# Patient Record
Sex: MALE | Race: WHITE | NOT HISPANIC OR LATINO | Employment: STUDENT | URBAN - NONMETROPOLITAN AREA
[De-identification: names, ages, dates, MRNs, and addresses within clinical notes are randomized per-mention and may not be internally consistent; named-entity substitution may affect disease eponyms.]

---

## 2017-04-11 ENCOUNTER — COMMUNICATION - GICH (OUTPATIENT)
Dept: FAMILY MEDICINE | Facility: OTHER | Age: 22
End: 2017-04-11

## 2017-04-11 DIAGNOSIS — F34.1 DYSTHYMIC DISORDER: ICD-10-CM

## 2018-01-04 NOTE — TELEPHONE ENCOUNTER
Patient Information     Patient Name MRN Nate Barba [id number] Male 1995      Telephone Encounter by Ronald Christian RN at 2017  4:35 PM     Author:  Ronald Christian RN Service:  (none) Author Type:  NURS- Registered Nurse     Filed:  2017  4:43 PM Encounter Date:  2017 Status:  Signed     :  Ronald Christian RN (NURS- Registered Nurse)            This is a Refill request from: [facility]  Name of Medication: Prozac  Quantity requested: 270 tabs  Last fill date: Unknown, but filled for 1 year supply in chart on 3/18/15  Due for refill: Yes  Last visit with SUMI BONE was on: 2015 in [facility]  PCP:  Sumi Bone MD  Controlled Substance Agreement:  N/A   Diagnosis r/t this medication request: Anxiety/depression    PHQ Depression Screening 2015   Date of PHQ exam (doc flow) 2015   1. Lack of interest/pleasure 2 - More than half the days   2. Feeling down/depressed 1 - Several days   PHQ-2 TOTAL SCORE 3   3. Trouble sleeping 0 - Not at all   4. Decreased energy 3 - Nearly every day   5. Appetite change 1 - Several days   6. Feelings of failure 1 - Several days   7. Trouble concentrating 1 - Several days   8. Activity level 2 - More than half the days   9. Hurting yourself 1 - Several days   PHQ-9 TOTAL SCORE 12   PHQ-9 Severity Level moderate   Functional Impairment somewhat difficult     Chart review shows that patient was last seen by PCP on 8/18/15 for continued use of prozac. Patient is overdue for an office visit with PCP. Writer called patient today to discuss. Unable to reach patient at this time. Detailed message left on patient's voicemail about need to be seen. Will route rx request to PCP for his consideration/approval.     Unable to complete prescription refill per RN Medication Refill Policy.................... Ronald Christian RN ....................  2017   4:37 PM

## 2018-01-05 ENCOUNTER — HISTORY (OUTPATIENT)
Dept: FAMILY MEDICINE | Facility: OTHER | Age: 23
End: 2018-01-05

## 2018-01-05 ENCOUNTER — OFFICE VISIT - GICH (OUTPATIENT)
Dept: FAMILY MEDICINE | Facility: OTHER | Age: 23
End: 2018-01-05

## 2018-01-05 DIAGNOSIS — J01.90 ACUTE SINUSITIS: ICD-10-CM

## 2018-01-05 ASSESSMENT — ANXIETY QUESTIONNAIRES
GAD7 TOTAL SCORE: 4
1. FEELING NERVOUS, ANXIOUS, OR ON EDGE: NOT AT ALL
7. FEELING AFRAID AS IF SOMETHING AWFUL MIGHT HAPPEN: NOT AT ALL
3. WORRYING TOO MUCH ABOUT DIFFERENT THINGS: SEVERAL DAYS
5. BEING SO RESTLESS THAT IT IS HARD TO SIT STILL: MORE THAN HALF THE DAYS
2. NOT BEING ABLE TO STOP OR CONTROL WORRYING: NOT AT ALL
4. TROUBLE RELAXING: SEVERAL DAYS
6. BECOMING EASILY ANNOYED OR IRRITABLE: NOT AT ALL

## 2018-01-05 ASSESSMENT — PATIENT HEALTH QUESTIONNAIRE - PHQ9: SUM OF ALL RESPONSES TO PHQ QUESTIONS 1-9: 7

## 2018-01-31 ENCOUNTER — DOCUMENTATION ONLY (OUTPATIENT)
Dept: FAMILY MEDICINE | Facility: OTHER | Age: 23
End: 2018-01-31

## 2018-01-31 RX ORDER — FLUTICASONE PROPIONATE 50 MCG
SPRAY, SUSPENSION (ML) NASAL
COMMUNITY
Start: 2015-08-06 | End: 2019-08-23

## 2018-01-31 RX ORDER — LORAZEPAM 0.5 MG/1
TABLET ORAL
COMMUNITY
Start: 2016-06-20 | End: 2019-08-23

## 2018-02-09 VITALS
HEIGHT: 76 IN | DIASTOLIC BLOOD PRESSURE: 68 MMHG | BODY MASS INDEX: 28.62 KG/M2 | TEMPERATURE: 96.4 F | SYSTOLIC BLOOD PRESSURE: 122 MMHG | HEART RATE: 60 BPM | WEIGHT: 235 LBS

## 2018-02-10 ASSESSMENT — ANXIETY QUESTIONNAIRES: GAD7 TOTAL SCORE: 4

## 2018-02-10 ASSESSMENT — PATIENT HEALTH QUESTIONNAIRE - PHQ9: SUM OF ALL RESPONSES TO PHQ QUESTIONS 1-9: 7

## 2018-02-12 NOTE — PROGRESS NOTES
Patient Information     Patient Name MRN Sex Nate Srinivasan [id number] Male 1995      Progress Notes by Shante Newton PA-C at 2018  9:15 AM     Author:  Shante Newton PA-C Service:  (none) Author Type:  PHYS- Physician Assistant     Filed:  2018 10:16 AM Encounter Date:  2018 Status:  Signed     :  Shante Newton PA-C (PHYS- Physician Assistant)            Nursing Notes:   Alicia Mast  2018  9:35 AM  Signed  Patient presents to the clinic for sinus congestion and feeling like everything smells horrible.  Alicia Mast LPN........................2018  9:27 AM      HPI:    Nate Nelson is a 22 y.o. male who presents for sinus congestion and plugged nose x few weeks. LNs hurt in neck.  Left ear pain. Everything smells bad. ST a week ago for a few days. No cough, wheezing, rattling.  No GI symptoms, fevers, chills.      Past Medical History:     Diagnosis  Date     Allergic rhinitis 2004     Articulation disorder     mild difficulty with R's and S's      Collapsed arches     prolapsed arches, congital - right foot surgery w Dr. Alaniz in [place] 3/06      Depression with anxiety      Emotional lability     possible dysthymia      Hepatitis      Major depressive episode     Neurologist-Dr. Encarnacion      Tourette's syndrome        Past Surgical History:      Procedure  Laterality Date     ANESTHESIA ALERT      Nate has had general anesthesia during tonsillectomy in  without difficulties.         LEG/ANKLE SURGERY  3/06    congital - right, Dr. Alaniz in [place] ,Right accessory navicular, surgical excision        TONSILLECTOMY  07/10/03    Dr. Dhillon-general anesthesia without difficulties       TYMPANOSTOMY      with tubes         Social History     Substance Use Topics       Smoking status: Never Smoker     Smokeless tobacco: Never Used     Alcohol use No       Current Outpatient Prescriptions       Medication  Sig Dispense Refill      "FLUoxetine (PROZAC) 20 mg capsule Take 3 capsules by mouth every morning. 270 capsule 3     fluticasone (50 mcg per actuation) nasal solution (FLONASE) INHALE 1 SPRAY INTO BOTH NOSTRILS ONCE DAILY. 1 Bottle 2     LORazepam (ATIVAN) 0.5 mg tab TAKE 1 TABLET BY MOUTH EVERY 6 HOURS AS NEEDED FOR ANXIETY. 30 tablet 3     No current facility-administered medications for this visit.      Medications have been reviewed by me and are current to the best of my knowledge and ability.      Allergies      Allergen   Reactions     Latex  *Unknown     Verbalizes rash      Tape [Adhesive]  *Unknown       REVIEW OF SYSTEMS:  Refer to HPI.    EXAM:   Vitals:    /68 (Cuff Site: Right Arm, Position: Sitting, Cuff Size: Adult Regular)  Pulse 60  Temp 96.4  F (35.8  C) (Tympanic)  Ht 1.918 m (6' 3.5\")  Wt 106.6 kg (235 lb)  BMI 28.99 kg/m2    General Appearance: Pleasant, alert, appropriate appearance for age. No acute distress  Ear Exam: Normal TM's bilaterally, grey, translucent, bony landmarks appreciated.   Left/Right TM: Effusion is not present. TM is not bulging. There is no pus appreciated.    Normal auditory canals and external ears. Non-tender.   OroPharynx Exam:  Erythematous posterior pharynx with no exudates. No sinus pain upon palpation of frontal, ethmoid, and maxillary sinuses.  Chest/Respiratory Exam: Normal chest wall and respirations. Clear to auscultation. No retractions appreciated.  Cardiovascular Exam: Regular rate and rhythm. S1, S2, no murmur, click, gallop, or rubs.  Lymphatic Exam: ACLN.  Skin: no rash or abnormalities  Psychiatric Exam: Alert and oriented - appropriate affect.    PHQ Depression Screen  Date of PHQ exam: 01/05/18  Over the last 2 weeks, how often have you been bothered by any of the following problems?  1. Little interest or pleasure in doing things: 1 - Several days  2. Feeling down, depressed, or hopeless: 2 - More than half the days  3. Trouble falling or staying asleep, or " sleeping too much: 0 - Not at all  4. Feeling tired or having little energy: 1 - Several days  5. Poor appetite or overeatin - Several days  6. Feeling bad about yourself - or that you are a failure or have let yourself or your family down: 0 - Not at all  7. Trouble concentrating on things, such as reading the newspaper or watching television: 2 - More than half the days  8. Moving or speaking so slowly that other people could have noticed. Or the opposite - being so fidgety or restless that you have been moving around a lot more than usual: 0 - Not at all  9. Thoughts that you would be better off dead, or of hurting yourself in some way: 0 - Not at all    PHQ-9 TOTAL SCORE: 7  Depression Severity Level: mild  If any answers were positive, how difficult have these problems made it for you to do your work, take care of things at home, or get along with other people: not difficult at all    LABS:    No results found for this visit on 18.    ASSESSMENT AND PLAN:      ICD-10-CM    1. Acute non-recurrent sinusitis, unspecified location J01.90 amoxicillin-clavulanate 875-125 mg tablet (AUGMENTIN)       Started on augmentin for an acute sinus infection.     Patient Instructions   Sinus infection - Antibiotic has been sent to pharmacy. Please take full course of antibiotic even if symptoms have completely resolved. This helps prevent against antibiotic resistance.     May use symptomatic care with tylenol or ibuprofen. May use cough syrup or cough drops.  Using a humidifier works well to break up the congestion. You can also sleep propped up on a couple pillows to decrease symptoms at night.     Use a Neti Pot/sinus flush (Charlie Med Sinus Rinse) 3 times daily to irrigate sinuses/mucosal tissue.     Sudafed or mucinex work well for congestion.   If you choose pseudoephedrine, use for only 5-7 days AS DIRECTED. Speak to your pharmacist if you have any concerns about your medications. May also use decongestant nasal  spray, but only for 3 days MAXIMUM.    Please take tylenol as needed up to 4 times daily.  Treat symptomatically with warm salt water gargles.  Lozenges, Tylenol, Advil or Aleve as needed. Frequent swallows of cool liquid.  Oatmeal coats the throat and some patients find it soothes the pain.     Monitor for any fevers or chills. Return in 7-10 days if not feeling better. Please call clinic with any questions or concerns. Please take in a lot of fluids and get rest.     You will need to be evaluated if you start to experience:  Fever higher than 102.5 F (39.2 C)   Sudden and severe pain in the face and head   Trouble seeing or seeing double   Trouble thinking clearly   Swelling or redness around 1 or both eyes   Trouble breathing or a stiff neck    * If you are a smoker, try to quit *    Call 9-1-1 or go to the emergency room if you:  Have trouble breathing   Are drooling because you cannot swallow your saliva   Have swelling of the neck or tongue   Cannot move your neck or have trouble opening your mouth        Shante Newton PA-C..................1/5/2018 9:35 AM

## 2018-02-12 NOTE — PATIENT INSTRUCTIONS
Patient Information     Patient Name MRN Nate Barba [id number] Male 1995      Patient Instructions by Shante Newton PA-C at 2018  9:15 AM     Author:  Shante Newton PA-C Service:  (none) Author Type:  PHYS- Physician Assistant     Filed:  2018  9:38 AM Encounter Date:  2018 Status:  Signed     :  Shante Newton PA-C (ESE- Physician Assistant)            Sinus infection - Antibiotic has been sent to pharmacy. Please take full course of antibiotic even if symptoms have completely resolved. This helps prevent against antibiotic resistance.     May use symptomatic care with tylenol or ibuprofen. May use cough syrup or cough drops.  Using a humidifier works well to break up the congestion. You can also sleep propped up on a couple pillows to decrease symptoms at night.     Use a Neti Pot/sinus flush (Charlie Med Sinus Rinse) 3 times daily to irrigate sinuses/mucosal tissue.     Sudafed or mucinex work well for congestion.   If you choose pseudoephedrine, use for only 5-7 days AS DIRECTED. Speak to your pharmacist if you have any concerns about your medications. May also use decongestant nasal spray, but only for 3 days MAXIMUM.    Please take tylenol as needed up to 4 times daily.  Treat symptomatically with warm salt water gargles.  Lozenges, Tylenol, Advil or Aleve as needed. Frequent swallows of cool liquid.  Oatmeal coats the throat and some patients find it soothes the pain.     Monitor for any fevers or chills. Return in 7-10 days if not feeling better. Please call clinic with any questions or concerns. Please take in a lot of fluids and get rest.     You will need to be evaluated if you start to experience:  Fever higher than 102.5 F (39.2 C)   Sudden and severe pain in the face and head   Trouble seeing or seeing double   Trouble thinking clearly   Swelling or redness around 1 or both eyes   Trouble breathing or a stiff neck    * If you are a smoker, try to quit  *    Call 9-1-1 or go to the emergency room if you:  Have trouble breathing   Are drooling because you cannot swallow your saliva   Have swelling of the neck or tongue   Cannot move your neck or have trouble opening your mouth

## 2018-02-12 NOTE — NURSING NOTE
Patient Information     Patient Name MRNate Rasmussen [id number] Male 1995      Nursing Note by Alicia Mast at 2018  9:15 AM     Author:  Alicia Mast Service:  (none) Author Type:  (none)     Filed:  2018  9:35 AM Encounter Date:  2018 Status:  Signed     :  Alicia Mast            Patient presents to the clinic for sinus congestion and feeling like everything smells horrible.  Alicia Mast LPN........................2018  9:27 AM

## 2018-04-26 DIAGNOSIS — F34.1 DYSTHYMIC DISORDER: Primary | ICD-10-CM

## 2018-04-26 NOTE — TELEPHONE ENCOUNTER
As per RN refill protocol, patient has to have a PHQ9 result of less than 5 in last 6 months for writer to be able to fill rx as requested. Last OV and PHQ9 were completed on 1/5/18 with RAHEEM Butler. Score was 7 as per office visit notes on that date. Writer will carroll up and route rx request to PCP for his consideration/approval at this time. No changes were noted to rx as requested in office visit notes on 1/5/18.    Unable to complete prescription refill per RN Medication Refill Policy. Ronald Christian 4/26/2018 9:14 AM

## 2018-07-12 ENCOUNTER — TELEPHONE (OUTPATIENT)
Dept: FAMILY MEDICINE | Facility: OTHER | Age: 23
End: 2018-07-12

## 2018-07-12 NOTE — TELEPHONE ENCOUNTER
Scheduled an appt for pt on Monday with DWS @ 9:15 for an order for a blood TB test that he needs done for school. I was not sure if pt needed to be seen or if the order can just be placed. Please let pt know if you can place order with out office visit. Call pt on primary cell ph#. Thanks.    Shanika Mello on 7/12/2018 at 5:03 PM

## 2018-07-13 NOTE — TELEPHONE ENCOUNTER
Patient is keeping appointment with Dr. Buchanan.    Prerna Sheppard LPN on 7/13/2018 at 10:29 AM

## 2018-07-13 NOTE — TELEPHONE ENCOUNTER
Left message for patient to call back. If he only needs the TB test, he can be put in the injection nurses schedule.    Prerna Sheppard LPN on 7/13/2018 at 8:38 AM

## 2018-07-16 ENCOUNTER — OFFICE VISIT (OUTPATIENT)
Dept: FAMILY MEDICINE | Facility: OTHER | Age: 23
End: 2018-07-16
Attending: FAMILY MEDICINE
Payer: COMMERCIAL

## 2018-07-16 VITALS
WEIGHT: 242 LBS | SYSTOLIC BLOOD PRESSURE: 126 MMHG | HEIGHT: 75 IN | DIASTOLIC BLOOD PRESSURE: 80 MMHG | BODY MASS INDEX: 30.09 KG/M2 | HEART RATE: 60 BPM

## 2018-07-16 DIAGNOSIS — Z23 ENCOUNTER FOR IMMUNIZATION: ICD-10-CM

## 2018-07-16 DIAGNOSIS — Z11.1 SCREENING EXAMINATION FOR PULMONARY TUBERCULOSIS: Primary | ICD-10-CM

## 2018-07-16 PROCEDURE — 36415 COLL VENOUS BLD VENIPUNCTURE: CPT | Performed by: FAMILY MEDICINE

## 2018-07-16 PROCEDURE — 86480 TB TEST CELL IMMUN MEASURE: CPT | Performed by: FAMILY MEDICINE

## 2018-07-16 PROCEDURE — 99213 OFFICE O/P EST LOW 20 MIN: CPT | Performed by: FAMILY MEDICINE

## 2018-07-16 NOTE — NURSING NOTE
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    Prerna Sheppard LPN on 7/16/2018 at 9:32 AM

## 2018-07-16 NOTE — LETTER
July 19, 2018      Nate Nelson  [address] [facility] [address]        Dear ,    We are writing to inform you of your test results.    Testing for tuberculosis came back negative.    Resulted Orders   M Tuberculosis by Quantiferon   Result Value Ref Range    M Tuberculosis Result Negative NEG^Negative    M Tuberculosis Antigen Value 0.00 IU/mL      Comment:      This is a qualitative test.  The TB antigen IU/mL value is required for   documentation on certain government reporting forms but this value should not   be used to monitor disease progression or response to therapy.  Diagnosing or excluding tuberculosis disease, and assessing the probability of   LTBI, require a combination of epidemiological, historical, medical and   diagnostic findings that should be taken into account when interpreting   QuantiFERON TB results.         If you have any questions or concerns, please call the clinic at the number listed above.       Sincerely,        Celso Buchanan MD

## 2018-07-16 NOTE — PROGRESS NOTES
"SUBJECTIVE:  Nate Nelson is a 23 year old male here for paperwork.  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  He needs his immunization form completed as well as testing for tuberculosis performed.  He has no specific questions today.    ROS:    As above otherwise ROS is unremarkable.    OBJECTIVE:  /80  Pulse 60  Ht 6' 3\" (1.905 m)  Wt 242 lb (109.8 kg)  BMI 30.25 kg/m2    EXAM:  General Appearance: Pleasant, alert, appropriate appearance for age. No acute distress    ASSESSEMENT AND PLAN:    1. Screening examination for pulmonary tuberculosis    2. Encounter for immunization      His paperwork for immunizations was completed.  Will get QuantiFERON testing for tuberculosis.  I will contact him with results when they return.  He will follow-up as needed.    Saeed Buchanan MD    This document was prepared using voice generated software.  While every attempt was made for accuracy, grammatical errors may exist.  "

## 2018-07-16 NOTE — MR AVS SNAPSHOT
"              After Visit Summary   7/16/2018    Nate Nelson    MRN: [id number]           Patient Information     Date Of Birth          1995        Visit Information        Provider Department      7/16/2018 9:15 AM Celso Buchanan MD [facility]        Today's Diagnoses     Screening examination for pulmonary tuberculosis    -  1    Encounter for immunization           Follow-ups after your visit        Who to contact     If you have questions or need follow up information about today's clinic visit or your schedule please contact [facility] directly at [phone].  Normal or non-critical lab and imaging results will be communicated to you by MyChart, letter or phone within 4 business days after the clinic has received the results. If you do not hear from us within 7 days, please contact the clinic through MyChart or phone. If you have a critical or abnormal lab result, we will notify you by phone as soon as possible.  Submit refill requests through [employer] or call your pharmacy and they will forward the refill request to us. Please allow 3 business days for your refill to be completed.          Additional Information About Your Visit        Care EveryWhere ID     This is your Care EveryWhere ID. This could be used by other organizations to access your [facility] medical records  [id number]        Your Vitals Were     Pulse Height BMI (Body Mass Index)             60 6' 3\" (1.905 m) 30.25 kg/m2          Blood Pressure from Last 3 Encounters:   07/16/18 126/80   01/05/18 122/68   08/18/15 106/70    Weight from Last 3 Encounters:   07/16/18 242 lb (109.8 kg)   01/05/18 235 lb (106.6 kg)   08/18/15 224 lb (101.6 kg)              We Performed the Following     Quantiferon TB Gold Plus        Primary Care Provider Office Phone # Fax #    Celso Buchanan [phone] [address] [facility] [address]        Equal Access to Services  "    HIEN DAVID : Hadii aad ku stephen Garcia, waaxda luqadaha, qaybta kaalmada michaelalyssageena, waxtaylor aliza angelesgianguyen ness . So [facility] [phone].    ATENCIÓN: Si habla español, tiene a jensen disposición servicios gratuitos de asistencia lingüística. Llame al [phone].    We comply with applicable federal civil rights laws and [place] laws. We do not discriminate on the basis of race, color, national origin, age, disability, sex, sexual orientation, or gender identity.            Thank you!     Thank you for choosing [facility] AND [facility]  for your care. Our goal is always to provide you with excellent care. Hearing back from our patients is one way we can continue to improve our services. Please take a few minutes to complete the written survey that you may receive in the mail after your visit with us. Thank you!             Your Updated Medication List - Protect others around you: Learn how to safely use, store and throw away your medicines at www.disposemymeds.org.          This list is accurate as of 7/16/18 10:00 AM.  Always use your most recent med list.                   Brand Name Dispense Instructions for use Diagnosis    FLUoxetine 20 MG capsule    PROzac    270 capsule    Take 3 capsules (60 mg) by mouth every morning    Dysthymic disorder       fluticasone 50 MCG/ACT spray    FLONASE     INHALE 1 SPRAY INTO BOTH NOSTRILS ONCE DAILY.        LORazepam 0.5 MG tablet    ATIVAN

## 2018-07-18 LAB
M TB TUBERC IFN-G BLD QL: NEGATIVE
M TB TUBERC IFN-G/MITOGEN IGNF BLD: 0 IU/ML

## 2019-03-19 DIAGNOSIS — F34.1 DYSTHYMIC DISORDER: ICD-10-CM

## 2019-03-19 NOTE — TELEPHONE ENCOUNTER
"Refill request from [facility] for:  FLUoxetine (PROZAC) 20 MG capsule      LOV 7/16/2018 with PCP-screening for TB  No changes noted to requested medication     Pt is due for medication management appt.    No upcoming appt at this time    Will refill for 90 days, send reminder letter, and add note to Rx    Requested Prescriptions   Pending Prescriptions Disp Refills     FLUoxetine (PROZAC) 20 MG capsule 270 capsule 3     Sig: Take 3 capsules (60 mg) by mouth every morning    SSRIs Protocol Failed - 3/19/2019  4:50 PM       Failed - PHQ-9 score less than 5 in past 6 months    Please review last PHQ-9 score.          Failed - Recent (6 mo) or future (30 days) visit within the authorizing provider's specialty    Patient had office visit in the last 6 months or has a visit in the next 30 days with authorizing provider or within the authorizing provider's specialty.  See \"Patient Info\" tab in inbasket, or \"Choose Columns\" in Meds & Orders section of the refill encounter.           Passed - Medication is active on med list       Passed - Patient is age 18 or older      Rachel Briones RN  ....................  3/19/2019   4:55 PM        "

## 2019-03-19 NOTE — LETTER
March 19, 2019      Nate Nelson  [address] [address]        Dear Nate,     A refill of FLUoxetine (PROZAC) 20 MG capsule has been requested by your pharmacy.  We noticed that it has been greater than 12 months since your last comprehensive visit and labs with Celso Buchanan MD.  A limited 90 day supply has been sent to your pharmacy at this time.    Additional refills require a medication management appointment.  Your health is very important to us.  Please call the clinic at [phone] to schedule your appointment.    Thank you,    The Refill Nurse  [facility]

## 2019-07-11 DIAGNOSIS — F34.1 DYSTHYMIC DISORDER: Primary | ICD-10-CM

## 2019-07-16 NOTE — TELEPHONE ENCOUNTER
[id number] in [facility] in [place] sent Rx request for the following:      FLUOXETINE HCL 20 MG CAPSULE  Sig: TAKE 3 CAPSULES BY MOUTH EVERY MORNING  Last Prescription Date:   3/19/19  Last Fill Qty/Refills:         270, R-0    Notes to Pharmacy: **Limited fill** pt due for med management appt. Please advise pt to contact GI for assistance in scheduling appt for further refills.  Thanks!  Last Office Visit:              7/16/18  Future Office visit:           None  Routing refill request to provider for review/approval because:  SSRIs Protocol Failed7/16 9:05 AM   PHQ-9 score less than 5 in past 6 months    Recent (6 mo) or future (30 days) visit within the authorizing provider's specialty     Per refill protocol, Pt was due for 6-month follow-up around 1/16/19. Pt overdue. Per refill encounter, dated 3/19/19, Pt was sent reminder letter and given ezio refill. Pt failed to schedule appointment.    Unable to complete prescription refill per RN Medication Refill Policy. Susi Gonzalez RN .............. 7/16/2019  9:14 AM

## 2019-08-23 ENCOUNTER — OFFICE VISIT (OUTPATIENT)
Dept: FAMILY MEDICINE | Facility: OTHER | Age: 24
End: 2019-08-23
Attending: FAMILY MEDICINE
Payer: COMMERCIAL

## 2019-08-23 VITALS
WEIGHT: 248.2 LBS | TEMPERATURE: 97.6 F | BODY MASS INDEX: 30.86 KG/M2 | HEART RATE: 64 BPM | DIASTOLIC BLOOD PRESSURE: 62 MMHG | SYSTOLIC BLOOD PRESSURE: 118 MMHG | HEIGHT: 75 IN | RESPIRATION RATE: 12 BRPM

## 2019-08-23 DIAGNOSIS — F34.1 DYSTHYMIC DISORDER: Primary | ICD-10-CM

## 2019-08-23 DIAGNOSIS — J30.2 SEASONAL ALLERGIC RHINITIS, UNSPECIFIED TRIGGER: ICD-10-CM

## 2019-08-23 PROCEDURE — 99213 OFFICE O/P EST LOW 20 MIN: CPT | Performed by: FAMILY MEDICINE

## 2019-08-23 RX ORDER — FLUTICASONE PROPIONATE 50 MCG
SPRAY, SUSPENSION (ML) NASAL
Qty: 15.8 ML | Refills: 3 | Status: SHIPPED | OUTPATIENT
Start: 2019-08-23 | End: 2023-04-20

## 2019-08-23 ASSESSMENT — ANXIETY QUESTIONNAIRES
2. NOT BEING ABLE TO STOP OR CONTROL WORRYING: NOT AT ALL
3. WORRYING TOO MUCH ABOUT DIFFERENT THINGS: NOT AT ALL
6. BECOMING EASILY ANNOYED OR IRRITABLE: NOT AT ALL
5. BEING SO RESTLESS THAT IT IS HARD TO SIT STILL: NOT AT ALL
GAD7 TOTAL SCORE: 0
1. FEELING NERVOUS, ANXIOUS, OR ON EDGE: NOT AT ALL
7. FEELING AFRAID AS IF SOMETHING AWFUL MIGHT HAPPEN: NOT AT ALL

## 2019-08-23 ASSESSMENT — PATIENT HEALTH QUESTIONNAIRE - PHQ9
SUM OF ALL RESPONSES TO PHQ QUESTIONS 1-9: 0
5. POOR APPETITE OR OVEREATING: NOT AT ALL

## 2019-08-23 ASSESSMENT — PAIN SCALES - GENERAL: PAINLEVEL: NO PAIN (0)

## 2019-08-23 ASSESSMENT — MIFFLIN-ST. JEOR: SCORE: 2196.46

## 2019-08-24 ASSESSMENT — ANXIETY QUESTIONNAIRES: GAD7 TOTAL SCORE: 0

## 2019-08-25 NOTE — PROGRESS NOTES
SUBJECTIVE:   Nate Nelson is a 24 year old male who presents to clinic today for the following health issues: Follow-up depression    Patient arrives here for follow-up depression.  He is currently on Prozac 60 mg a day.  He was not able to get into see his primary care physician.  States that he is doing well.  Not complaining of any side effects.  Patient has a history of dysthymia in the chart.        Patient Active Problem List    Diagnosis Date Noted     Impetigo 11/04/2010     Priority: Medium     Overview:   around mouth and nares       Dysthymic disorder 04/05/2007     Priority: Medium     Overview:   with OCD tendincies       Congenital pes planus 05/10/2006     Priority: Medium     Allergic rhinitis 03/01/2004     Priority: Medium     Other developmental speech or language disorder 08/01/2003     Priority: Medium     Overview:   mild difficulty with R's and S's       Tourette's disorder 04/01/2002     Priority: Medium     Past Medical History:   Diagnosis Date     Acquired flat foot     prolapsed arches, congital - right foot surgery w Dr. Alaniz in [place] 3/06     Allergic rhinitis     2004     Emotional lability     possible dysthymia     Inflammatory liver disease     1999     Major depressive disorder, single episode     Neurologist-Dr. Encarnacion     Other specified anxiety disorders     No Comments Provided     Phonological disorder     2003,mild difficulty with R's and S's     Tourette's disorder     2002      Past Surgical History:   Procedure Laterality Date     ANKLE SURGERY      3/06,congital - right, Dr. Alaniz in [place] ,Right accessory navicular, surgical excision     OTHER SURGICAL HISTORY      [id number],ANESTHESIA ALERT,Nate has had general anesthesia during tonsillectomy in 2003 without difficulties.     TONSILLECTOMY      07/10/03,Dr. Dhillon-general anesthesia without difficulties     TYMPANOSTOMY, LOCAL/TOPICAL ANESTHESIA      1995,with tubes     Current Outpatient Medications  "  Medication Sig Dispense Refill     FLUoxetine (PROZAC) 20 MG capsule TAKE 3 CAPSULES BY MOUTH EVERY MORNING 270 capsule 3     fluticasone (FLONASE) 50 MCG/ACT nasal spray INHALE 1 SPRAY INTO BOTH NOSTRILS ONCE DAILY. 15.8 mL 3     Allergies   Allergen Reactions     Latex Unknown     Verbalizes rash     Liquid Adhesive Unknown       Review of Systems     OBJECTIVE:     /62   Pulse 64   Temp 97.6  F (36.4  C)   Resp 12   Ht 1.905 m (6' 3\")   Wt 112.6 kg (248 lb 3.2 oz)   BMI 31.02 kg/m    Body mass index is 31.02 kg/m .  Physical Exam   Constitutional: He appears well-developed and well-nourished.   Neurological: He is alert.   Psychiatric: He has a normal mood and affect. His behavior is normal.           ASSESSMENT/PLAN:         1. Dysthymic disorder  Refilled  - FLUoxetine (PROZAC) 20 MG capsule; TAKE 3 CAPSULES BY MOUTH EVERY MORNING  Dispense: 270 capsule; Refill: 3    2. Seasonal allergic rhinitis, unspecified trigger  Refill  - fluticasone (FLONASE) 50 MCG/ACT nasal spray; INHALE 1 SPRAY INTO BOTH NOSTRILS ONCE DAILY.  Dispense: 15.8 mL; Refill: 3    Follow-up 1 year unless needing follow-up sooner  Nate Figueroa MD  [facility] AND [facility]  "

## 2020-05-28 ENCOUNTER — OFFICE VISIT (OUTPATIENT)
Dept: FAMILY MEDICINE | Facility: OTHER | Age: 25
End: 2020-05-28
Attending: FAMILY MEDICINE
Payer: COMMERCIAL

## 2020-05-28 VITALS
DIASTOLIC BLOOD PRESSURE: 66 MMHG | OXYGEN SATURATION: 96 % | HEART RATE: 62 BPM | HEIGHT: 76 IN | TEMPERATURE: 98 F | WEIGHT: 273.2 LBS | BODY MASS INDEX: 33.27 KG/M2 | SYSTOLIC BLOOD PRESSURE: 124 MMHG | RESPIRATION RATE: 16 BRPM

## 2020-05-28 DIAGNOSIS — Z00.00 ROUTINE GENERAL MEDICAL EXAMINATION AT A HEALTH CARE FACILITY: Primary | ICD-10-CM

## 2020-05-28 DIAGNOSIS — Z13.1 SCREENING FOR DIABETES MELLITUS: ICD-10-CM

## 2020-05-28 DIAGNOSIS — F34.1 DYSTHYMIC DISORDER: ICD-10-CM

## 2020-05-28 DIAGNOSIS — Z13.220 LIPID SCREENING: ICD-10-CM

## 2020-05-28 LAB
ANION GAP SERPL CALCULATED.3IONS-SCNC: 7 MMOL/L (ref 3–14)
BUN SERPL-MCNC: 17 MG/DL (ref 7–25)
CALCIUM SERPL-MCNC: 9.3 MG/DL (ref 8.6–10.3)
CHLORIDE SERPL-SCNC: 105 MMOL/L (ref 98–107)
CHOLEST SERPL-MCNC: 135 MG/DL
CO2 SERPL-SCNC: 28 MMOL/L (ref 21–31)
CREAT SERPL-MCNC: 1.18 MG/DL (ref 0.7–1.3)
GFR SERPL CREATININE-BSD FRML MDRD: 75 ML/MIN/{1.73_M2}
GLUCOSE SERPL-MCNC: 94 MG/DL (ref 70–105)
HDLC SERPL-MCNC: 30 MG/DL (ref 23–92)
LDLC SERPL CALC-MCNC: 77 MG/DL
NONHDLC SERPL-MCNC: 105 MG/DL
POTASSIUM SERPL-SCNC: 4.2 MMOL/L (ref 3.5–5.1)
SODIUM SERPL-SCNC: 140 MMOL/L (ref 134–144)
TRIGL SERPL-MCNC: 139 MG/DL

## 2020-05-28 PROCEDURE — 99395 PREV VISIT EST AGE 18-39: CPT | Performed by: FAMILY MEDICINE

## 2020-05-28 PROCEDURE — 80061 LIPID PANEL: CPT | Mod: ZL | Performed by: FAMILY MEDICINE

## 2020-05-28 PROCEDURE — 36415 COLL VENOUS BLD VENIPUNCTURE: CPT | Mod: ZL | Performed by: FAMILY MEDICINE

## 2020-05-28 PROCEDURE — 80048 BASIC METABOLIC PNL TOTAL CA: CPT | Mod: ZL | Performed by: FAMILY MEDICINE

## 2020-05-28 ASSESSMENT — PAIN SCALES - GENERAL: PAINLEVEL: NO PAIN (0)

## 2020-05-28 ASSESSMENT — ANXIETY QUESTIONNAIRES
3. WORRYING TOO MUCH ABOUT DIFFERENT THINGS: NOT AT ALL
5. BEING SO RESTLESS THAT IT IS HARD TO SIT STILL: NOT AT ALL
GAD7 TOTAL SCORE: 0
IF YOU CHECKED OFF ANY PROBLEMS ON THIS QUESTIONNAIRE, HOW DIFFICULT HAVE THESE PROBLEMS MADE IT FOR YOU TO DO YOUR WORK, TAKE CARE OF THINGS AT HOME, OR GET ALONG WITH OTHER PEOPLE: NOT DIFFICULT AT ALL
1. FEELING NERVOUS, ANXIOUS, OR ON EDGE: NOT AT ALL
7. FEELING AFRAID AS IF SOMETHING AWFUL MIGHT HAPPEN: NOT AT ALL
2. NOT BEING ABLE TO STOP OR CONTROL WORRYING: NOT AT ALL
6. BECOMING EASILY ANNOYED OR IRRITABLE: NOT AT ALL

## 2020-05-28 ASSESSMENT — MIFFLIN-ST. JEOR: SCORE: 2317.79

## 2020-05-28 ASSESSMENT — PATIENT HEALTH QUESTIONNAIRE - PHQ9: 5. POOR APPETITE OR OVEREATING: NOT AT ALL

## 2020-05-28 NOTE — NURSING NOTE
"Coming in for a physical and would like labs    Chief Complaint   Patient presents with     Physical     and labs       Initial /66   Pulse 62   Temp 98  F (36.7  C)   Resp 16   Ht 1.918 m (6' 3.5\")   Wt 123.9 kg (273 lb 3.2 oz)   SpO2 96%   BMI 33.70 kg/m   Estimated body mass index is 33.7 kg/m  as calculated from the following:    Height as of this encounter: 1.918 m (6' 3.5\").    Weight as of this encounter: 123.9 kg (273 lb 3.2 oz).  Medication Reconciliation: complete    Christina Lynne, LPN  "

## 2020-05-28 NOTE — PROGRESS NOTES
3  SUBJECTIVE:   CC: Nate Nelson is an 24 year old male who presents for preventive health visit.     Healthy Habits:    Do you get at least three servings of calcium containing foods daily (dairy, green leafy vegetables, etc.)? yes    Amount of exercise or daily activities, outside of work: 7 day(s) per week    Problems taking medications regularly No    Medication side effects: No    Have you had an eye exam in the past two years? no    Do you see a dentist twice per year? 1-2    Do you have sleep apnea, excessive snoring or daytime drowsiness?no           Today's PHQ-2 Score:   PHQ-2 ( 1999 [employer]) 5/28/2020 7/16/2018   Q1: Little interest or pleasure in doing things 0 0   Q2: Feeling down, depressed or hopeless 0 0   PHQ-2 Score 0 0       Abuse: Current or Past(Physical, Sexual or Emotional)- No  Do you feel safe in your environment? Yes        Social History     Tobacco Use     Smoking status: Never Smoker     Smokeless tobacco: Never Used   Substance Use Topics     Alcohol use: Yes     Comment: 2 a week      If you drink alcohol do you typically have >3 drinks per day or >7 drinks per week? No                      Last PSA: No results found for: PSA    Reviewed orders with patient. Reviewed health maintenance and updated orders accordingly - Yes  Current Outpatient Medications   Medication Sig Dispense Refill     FLUoxetine (PROZAC) 20 MG capsule TAKE 3 CAPSULES BY MOUTH EVERY MORNING 270 capsule 3     fluticasone (FLONASE) 50 MCG/ACT nasal spray INHALE 1 SPRAY INTO BOTH NOSTRILS ONCE DAILY. 15.8 mL 3     Allergies   Allergen Reactions     Latex Unknown     Verbalizes rash     Liquid Adhesive Unknown       Reviewed and updated as needed this visit by clinical staff  Tobacco  Allergies  Meds  Med Hx  Soc Hx        Reviewed and updated as needed this visit by Provider        Past Medical History:   Diagnosis Date     Acquired flat foot     prolapsed arches, congital - right foot surgery w Dr. Alaniz  "in [place] 3/06     Allergic rhinitis     2004     Emotional lability     possible dysthymia     Inflammatory liver disease     1999     Major depressive disorder, single episode     Neurologist-Dr. Encarnacion     Other specified anxiety disorders     No Comments Provided     Phonological disorder     2003,mild difficulty with R's and S's     Tourette's disorder     2002      Past Surgical History:   Procedure Laterality Date     ANKLE SURGERY      3/06,congital - right, Dr. Alaniz in [place] ,Right accessory navicular, surgical excision     OTHER SURGICAL HISTORY      [id number],ANESTHESIA ALERT,Nate has had general anesthesia during tonsillectomy in 2003 without difficulties.     TONSILLECTOMY      07/10/03,Dr. Dhillon-general anesthesia without difficulties     TYMPANOSTOMY, LOCAL/TOPICAL ANESTHESIA      1995,with tubes       ROS:  CONSTITUTIONAL: NEGATIVE for fever, chills, change in weight  INTEGUMENTARY/SKIN: NEGATIVE for worrisome rashes, moles or lesions  EYES: NEGATIVE for vision changes or irritation  ENT: NEGATIVE for ear, mouth and throat problems  RESP: NEGATIVE for significant cough or SOB  CV: NEGATIVE for chest pain, palpitations or peripheral edema  GI: NEGATIVE for nausea, abdominal pain, heartburn, or change in bowel habits   male: negative for dysuria, hematuria, decreased urinary stream, erectile dysfunction, urethral discharge  MUSCULOSKELETAL: NEGATIVE for significant arthralgias or myalgia  NEURO: NEGATIVE for weakness, dizziness or paresthesias  PSYCHIATRIC: NEGATIVE for changes in mood or affect    OBJECTIVE:   /66   Pulse 62   Temp 98  F (36.7  C)   Resp 16   Ht 1.918 m (6' 3.5\")   Wt 123.9 kg (273 lb 3.2 oz)   SpO2 96%   BMI 33.70 kg/m    EXAM:  GENERAL: healthy, alert and no distress  EYES: Eyes grossly normal to inspection, PERRL and conjunctivae and sclerae normal  HENT: ear canals and TM's normal, nose and mouth without ulcers or lesions  NECK: no adenopathy, no asymmetry, " "masses, or scars and thyroid normal to palpation  RESP: lungs clear to auscultation - no rales, rhonchi or wheezes  CV: regular rate and rhythm, normal S1 S2, no S3 or S4, no murmur, click or rub, no peripheral edema and peripheral pulses strong  ABDOMEN: soft, nontender, no hepatosplenomegaly, no masses and bowel sounds normal  MS: no gross musculoskeletal defects noted, no edema  SKIN: no suspicious lesions or rashes  NEURO: Normal strength and tone, mentation intact and speech normal  PSYCH: mentation appears normal, affect normal/bright    Diagnostic Test Results:  Labs reviewed in Epic    ASSESSMENT/PLAN:       ICD-10-CM    1. Routine general medical examination at a health care facility  Z00.00    2. Dysthymic disorder  F34.1 FLUoxetine (PROZAC) 20 MG capsule   3. Lipid screening  Z13.220 Lipid Panel   4. Screening for diabetes mellitus  Z13.1 Basic Metabolic Panel        COUNSELING:  Reviewed preventive health counseling, as reflected in patient instructions       Regular exercise       Healthy diet/nutrition    Estimated body mass index is 33.7 kg/m  as calculated from the following:    Height as of this encounter: 1.918 m (6' 3.5\").    Weight as of this encounter: 123.9 kg (273 lb 3.2 oz).    Weight management plan: Discussed healthy diet and exercise guidelines     reports that he has never smoked. He has never used smokeless tobacco.      Counseling Resources:  ATP IV Guidelines  Pooled Cohorts Equation Calculator  FRAX Risk Assessment  ICSI Preventive Guidelines  Dietary Guidelines for Americans, 2010  USDA's MyPlate  ASA Prophylaxis  Lung CA Screening    Sunday Hernandez MD  [facility]  "

## 2020-05-29 ASSESSMENT — ANXIETY QUESTIONNAIRES: GAD7 TOTAL SCORE: 0

## 2020-12-27 ENCOUNTER — HEALTH MAINTENANCE LETTER (OUTPATIENT)
Age: 25
End: 2020-12-27

## 2021-07-21 ENCOUNTER — TELEPHONE (OUTPATIENT)
Dept: FAMILY MEDICINE | Facility: OTHER | Age: 26
End: 2021-07-21

## 2021-07-21 DIAGNOSIS — F34.1 DYSTHYMIC DISORDER: ICD-10-CM

## 2021-07-22 NOTE — TELEPHONE ENCOUNTER
Refill request for FLUoxetine from [facility].  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  He agreed to complete the PHQ 9 via [employer] and will be looking at his schedule to see when he can get back up here for a visit.  Routing to PCP to address a 90 day supply to get him through until he can schedule.  When asked he said he is doing well, that all is going fine on this medication.  Unable to complete prescription refill per RN Medication Refill Policy. Elise Spencer RN 7/22/2021 2:10 PM

## 2021-07-23 NOTE — TELEPHONE ENCOUNTER
Left message for patient notifying him of refill.    Prerna Sheppard LPN on 7/23/2021 at 2:44 PM

## 2021-08-14 ENCOUNTER — HEALTH MAINTENANCE LETTER (OUTPATIENT)
Age: 26
End: 2021-08-14

## 2021-10-09 ENCOUNTER — HEALTH MAINTENANCE LETTER (OUTPATIENT)
Age: 26
End: 2021-10-09

## 2022-09-17 ENCOUNTER — HEALTH MAINTENANCE LETTER (OUTPATIENT)
Age: 27
End: 2022-09-17

## 2022-12-14 ENCOUNTER — MYC MEDICAL ADVICE (OUTPATIENT)
Dept: FAMILY MEDICINE | Facility: OTHER | Age: 27
End: 2022-12-14

## 2022-12-29 DIAGNOSIS — F34.1 DYSTHYMIC DISORDER: ICD-10-CM

## 2022-12-29 NOTE — TELEPHONE ENCOUNTER
[facility] - [place], [place] - [address] [place] [address] [address] sent Rx request for the following:      Requested Prescriptions   Pending Prescriptions Disp Refills     FLUoxetine (PROZAC) 20 MG capsule [Pharmacy Med Name: fluoxetine 20 mg capsule] 270 capsule 3     Sig: TAKE THREE CAPSULES BY MOUTH EVERY MORNING       SSRIs Protocol Failed - 12/29/2022  4:38 PM        Failed - PHQ-9 score less than 5 in past 6 months     Please review last PHQ-9 score.           Failed - Recent (6 mo) or future (30 days) visit within the authorizing provider's specialty     Last Prescription Date:   7/22/21  Last Fill Qty/Refills:         270, R-3    Last Office Visit:              5/28/20   Future Office visit:           None    Virtual Appointment scheduled for 1/3/23, for prozac refill was cancelled by Dr. Bailey, due to having a closed practice.    Pt due for annual exam. Routing to provider for refill consideration. Routing to  OUTREACH APPT REQUESTS pool, to assist Pt in scheduling appointment.     In clinical absence of patient's primary, Celso Buchanan, patient is requesting that this message be sent to the covering provider for consideration please.    Susi Gonzalez RN .............. 12/29/2022  4:41 PM

## 2023-02-02 ASSESSMENT — PATIENT HEALTH QUESTIONNAIRE - PHQ9
SUM OF ALL RESPONSES TO PHQ QUESTIONS 1-9: 17
SUM OF ALL RESPONSES TO PHQ QUESTIONS 1-9: 17
10. IF YOU CHECKED OFF ANY PROBLEMS, HOW DIFFICULT HAVE THESE PROBLEMS MADE IT FOR YOU TO DO YOUR WORK, TAKE CARE OF THINGS AT HOME, OR GET ALONG WITH OTHER PEOPLE: VERY DIFFICULT

## 2023-02-02 NOTE — PROGRESS NOTES
Assessment & Plan     1. Dysthymic disorder  Chronic, stable. Refilled as below. Follow up as needed.   - FLUoxetine (PROZAC) 20 MG capsule; Take three capsules by mouth daily  Dispense: 90 capsule; Refill: 11    Return if symptoms worsen or fail to improve.    Ángela Hansen PA-C  [facility] AND [facility]    Jus Sullivan is a 27 year old, presenting for the following health issues:  Refill Request      History of Present Illness       Reason for visit:  Med check    He eats 2-3 servings of fruits and vegetables daily.He consumes 0 sweetened beverage(s) daily.He exercises with enough effort to increase his heart rate 30 to 60 minutes per day.  He exercises with enough effort to increase his heart rate 4 days per week.   He is taking medications regularly.    Today's PHQ-9         PHQ-9 Total Score: 17    PHQ-9 Q9 Thoughts of better off dead/self-harm past 2 weeks :   Several days  Thoughts of suicide or self harm: (P) No  Self-harm Plan:     Self-harm Action:       Safety concerns for self or others: (P) No    How difficult have these problems made it for you to do your work, take care of things at home, or get along with other people: Very difficult     Here for follow up on mood. Has been on Prozac for many years. Doing ok with the 60 mg daily. 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 Is also in the process of being evaluated for sleep apnea and feels this could be contributing to fatigue levels. Would like to hold off on dose adjustment for a few months and if still no improvement consider changing.     PAST MEDICAL HISTORY:   Past Medical History:   Diagnosis Date     Acquired flat foot     prolapsed arches, congital - right foot surgery w Dr. Alaniz in [place] 3/06     Allergic rhinitis     2004     Emotional lability     possible dysthymia     Inflammatory liver disease     1999     Major depressive disorder, single episode     Neurologist-Dr. Encarnacion  "    Other specified anxiety disorders     No Comments Provided     Phonological disorder     2003,mild difficulty with R's and S's     Tourette's disorder     2002       PAST SURGICAL HISTORY:   Past Surgical History:   Procedure Laterality Date     ANKLE SURGERY      3/06,congital - right, Dr. Alaniz in [place] ,Right accessory navicular, surgical excision     OTHER SURGICAL HISTORY      [id number],ANESTHESIA ALERT,Nate has had general anesthesia during tonsillectomy in 2003 without difficulties.     TONSILLECTOMY      07/10/03,Dr. Dhillon-general anesthesia without difficulties     TYMPANOSTOMY, LOCAL/TOPICAL ANESTHESIA      1995,with tubes       FAMILY HISTORY:   Family History   Problem Relation Age of Onset     Other - See Comments Mother         Psychiatric illness,Depression, on Lexapro and Wellbutrin     Other - See Comments Father         IgA nephropathy, status post renal transplant. polycystic kidney dis.     Diabetes Paternal Grandfather         Diabetes     Other - See Comments Paternal Grandmother         polycystic kidney dis     Other - See Comments Brother         ADHD     Other - See Comments Other         Malignant hyperthermia     Heart Disease No family hx of         Heart Disease       SOCIAL HISTORY:   Social History     Tobacco Use     Smoking status: Never     Smokeless tobacco: Never   Substance Use Topics     Alcohol use: Yes     Comment: 2 a week         Allergies   Allergen Reactions     Latex Unknown     Verbalizes rash     Liquid Adhesive Unknown     Current Outpatient Medications   Medication     FLUoxetine (PROZAC) 20 MG capsule     fluticasone (FLONASE) 50 MCG/ACT nasal spray     No current facility-administered medications for this visit.         Review of Systems   Per HPI        Objective    /72   Pulse 67   Temp 96.8  F (36  C)   Resp 14   Ht 1.905 m (6' 3\")   Wt 124.6 kg (274 lb 12.8 oz)   SpO2 99%   BMI 34.35 kg/m    Body mass index is 34.35 kg/m .  Physical Exam "   General: Pleasant, in no apparent distress.  Psych: Appropriate mood and affect.

## 2023-02-03 ENCOUNTER — OFFICE VISIT (OUTPATIENT)
Dept: FAMILY MEDICINE | Facility: OTHER | Age: 28
End: 2023-02-03
Attending: PHYSICIAN ASSISTANT
Payer: COMMERCIAL

## 2023-02-03 VITALS
SYSTOLIC BLOOD PRESSURE: 128 MMHG | BODY MASS INDEX: 34.17 KG/M2 | HEIGHT: 75 IN | RESPIRATION RATE: 14 BRPM | DIASTOLIC BLOOD PRESSURE: 72 MMHG | OXYGEN SATURATION: 99 % | WEIGHT: 274.8 LBS | HEART RATE: 67 BPM | TEMPERATURE: 96.8 F

## 2023-02-03 DIAGNOSIS — F34.1 DYSTHYMIC DISORDER: ICD-10-CM

## 2023-02-03 PROCEDURE — 99213 OFFICE O/P EST LOW 20 MIN: CPT | Performed by: PHYSICIAN ASSISTANT

## 2023-02-03 ASSESSMENT — PATIENT HEALTH QUESTIONNAIRE - PHQ9
10. IF YOU CHECKED OFF ANY PROBLEMS, HOW DIFFICULT HAVE THESE PROBLEMS MADE IT FOR YOU TO DO YOUR WORK, TAKE CARE OF THINGS AT HOME, OR GET ALONG WITH OTHER PEOPLE: VERY DIFFICULT
SUM OF ALL RESPONSES TO PHQ QUESTIONS 1-9: 17

## 2023-02-03 ASSESSMENT — PAIN SCALES - GENERAL: PAINLEVEL: NO PAIN (0)

## 2023-04-18 ASSESSMENT — SLEEP AND FATIGUE QUESTIONNAIRES
HOW LIKELY ARE YOU TO NOD OFF OR FALL ASLEEP WHILE SITTING AND READING: MODERATE CHANCE OF DOZING
HOW LIKELY ARE YOU TO NOD OFF OR FALL ASLEEP WHILE SITTING AND TALKING TO SOMEONE: SLIGHT CHANCE OF DOZING
HOW LIKELY ARE YOU TO NOD OFF OR FALL ASLEEP WHEN YOU ARE A PASSENGER IN A CAR FOR AN HOUR WITHOUT A BREAK: HIGH CHANCE OF DOZING
HOW LIKELY ARE YOU TO NOD OFF OR FALL ASLEEP WHILE SITTING QUIETLY AFTER LUNCH WITHOUT ALCOHOL: SLIGHT CHANCE OF DOZING
HOW LIKELY ARE YOU TO NOD OFF OR FALL ASLEEP WHILE WATCHING TV: SLIGHT CHANCE OF DOZING
HOW LIKELY ARE YOU TO NOD OFF OR FALL ASLEEP WHILE LYING DOWN TO REST IN THE AFTERNOON WHEN CIRCUMSTANCES PERMIT: HIGH CHANCE OF DOZING
HOW LIKELY ARE YOU TO NOD OFF OR FALL ASLEEP WHILE SITTING INACTIVE IN A PUBLIC PLACE: HIGH CHANCE OF DOZING
HOW LIKELY ARE YOU TO NOD OFF OR FALL ASLEEP IN A CAR, WHILE STOPPED FOR A FEW MINUTES IN TRAFFIC: MODERATE CHANCE OF DOZING

## 2023-04-20 ENCOUNTER — OFFICE VISIT (OUTPATIENT)
Dept: SLEEP MEDICINE | Facility: CLINIC | Age: 28
End: 2023-04-20
Payer: COMMERCIAL

## 2023-04-20 VITALS
DIASTOLIC BLOOD PRESSURE: 85 MMHG | OXYGEN SATURATION: 97 % | SYSTOLIC BLOOD PRESSURE: 132 MMHG | BODY MASS INDEX: 34.17 KG/M2 | HEART RATE: 59 BPM | WEIGHT: 274.8 LBS | HEIGHT: 75 IN

## 2023-04-20 DIAGNOSIS — F34.1 DYSTHYMIC DISORDER: ICD-10-CM

## 2023-04-20 DIAGNOSIS — G47.19 EXCESSIVE DAYTIME SLEEPINESS: ICD-10-CM

## 2023-04-20 DIAGNOSIS — G47.33 OSA (OBSTRUCTIVE SLEEP APNEA): Primary | ICD-10-CM

## 2023-04-20 PROCEDURE — 99204 OFFICE O/P NEW MOD 45 MIN: CPT | Performed by: PHYSICIAN ASSISTANT
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
## 2023-05-23 ENCOUNTER — MYC MEDICAL ADVICE (OUTPATIENT)
Dept: FAMILY MEDICINE | Facility: OTHER | Age: 28
End: 2023-05-23
Payer: COMMERCIAL

## 2023-05-23 DIAGNOSIS — F34.1 DYSTHYMIC DISORDER: Primary | ICD-10-CM

## 2023-05-24 ENCOUNTER — NURSE TRIAGE (OUTPATIENT)
Dept: FAMILY MEDICINE | Facility: OTHER | Age: 28
End: 2023-05-24
Payer: COMMERCIAL

## 2023-05-24 NOTE — TELEPHONE ENCOUNTER
S-(situation): see [employer] message below    B-(background): see [employer] message below    A-(assessment): see [employer] message below for symptoms. Patient was called and denies any self harm or suicidal ideation- states he has never had these thoughts.    R-(recommendations): Patient's PCP did reach out to him and they are communicating through [employer]. Patient also states he will schedule a clinic appointment with PCP or other provider to discuss if PCP recommends in MyChart conversation. Patient has a strong support system and verbalized understanding on ways to reach out if needed.    Reason for Disposition    Symptoms interfere with work or school    Depression is worsening (e.g.,sleeping poorly, less able to do activities of daily living)    Additional Information    Negative: Patient attempted suicide    Negative: Patient is threatening suicide now    Negative: Violent behavior, or threatening to physically hurt or kill someone    Negative: Patient is very confused (disoriented, slurred speech) and no other adult (e.g., friend or family member) available    Negative: Difficult to awaken or acting very confused (disoriented, slurred speech) and new-onset    Negative: Sounds like a life-threatening emergency to the triager    Negative: Suicide thoughts, threats, attempts, or questions    Negative: Questions or concerns about alcohol use, unhealthy alcohol use, binge drinking, intoxication, or withdrawal    Negative: Questions or concerns about substance use (drug use), unhealthy drug use, intoxication, or withdrawal    Negative: Anxiety is main problem or symptom    Negative: Depression and unable to do any of normal activities (e.g., self care, school, work; in comparison to baseline).    Negative: Very strange or confused behavior    Negative: Patient sounds very sick or weak to the triager    Negative: Fever > 101 F  (38.3 C)    Negative: Sometimes has thoughts of suicide    Protocols used: DEPRESSION-A-OH

## 2023-05-24 NOTE — TELEPHONE ENCOUNTER
[facility] Message for triage or scheduling:     Dr. Buchanan, I was hoping to get your perspective on a concern I m having. Have always had depressive episodes during big changes in my life, and are going through the biggest one ever. Now find myself with the normal symptoms (sadness, fatigue, anhedonia), but much worse. Also have physical symptoms like horrible stomach pains, no taste, absolutely no appetite. It s been a week of nothing but these symptoms and constant crying. Unsure what to do and it worries me to change my Prozac, but trust your judgment and experience with what you may have done with somebody similar in the past. I m excited about everything working out so well which makes this even more confusing. I ruminate on all the memories and experiences and just get lost in these horrible depressive sadness waves. If you suggest an appointment would you suggest I see a psychiatrist?      Sent to Unit 1 Care Team RN.      Dixie Laguna RN on 5/24/2023 at 9:44 AM

## 2023-05-24 NOTE — TELEPHONE ENCOUNTER
Copied [employer] message into a triage encounter and sent to Unit 1 Care team RN for triage and/or scheduling with available provider.    Dixie Laguna RN on 5/24/2023 at 9:45 AM

## 2023-05-25 RX ORDER — BUPROPION HYDROCHLORIDE 150 MG/1
300 TABLET ORAL EVERY MORNING
Qty: 180 TABLET | Refills: 3 | Status: SHIPPED | OUTPATIENT
Start: 2023-05-25

## 2023-05-25 NOTE — TELEPHONE ENCOUNTER
Yes I can send in Rx. I tried but cannot find the [facility] pharmacy in [place]. If the wellbutrin  mg can be carroll'd up for the correct pharmacy I can send it

## 2023-06-14 ENCOUNTER — MYC MEDICAL ADVICE (OUTPATIENT)
Dept: FAMILY MEDICINE | Facility: OTHER | Age: 28
End: 2023-06-14
Payer: COMMERCIAL

## 2023-06-14 DIAGNOSIS — Z11.1 SCREENING FOR TUBERCULOSIS: Primary | ICD-10-CM

## 2023-10-08 ENCOUNTER — HEALTH MAINTENANCE LETTER (OUTPATIENT)
Age: 28
End: 2023-10-08

## 2024-02-20 ENCOUNTER — MYC REFILL (OUTPATIENT)
Dept: FAMILY MEDICINE | Facility: OTHER | Age: 29
End: 2024-02-20
Payer: COMMERCIAL

## 2024-02-20 DIAGNOSIS — F34.1 DYSTHYMIC DISORDER: ICD-10-CM

## 2024-02-22 NOTE — TELEPHONE ENCOUNTER
Disp Refills Start End [facility]   FLUoxetine (PROZAC) 20 MG capsule 90 capsule 2 2/22/2024 -- No   Sig: Take 3 capsules (60 mg) by mouth 1 time per day   Sent to pharmacy as: FLUoxetine HCl 20 MG Oral Capsule (PROzac)   Class: E-Prescribe   Order: [id number]   E-Prescribing Status: Receipt confirmed by pharmacy (2/22/2024  8:18 AM CST)   To [facility]    Should have refills  Abril Aranda RN on 2/22/2024 at 10:05 AM

## 2024-06-03 DIAGNOSIS — F34.1 DYSTHYMIC DISORDER: Primary | ICD-10-CM

## 2024-06-03 RX ORDER — BUPROPION HYDROCHLORIDE 300 MG/1
300 TABLET ORAL EVERY MORNING
Qty: 90 TABLET | Refills: 3 | Status: SHIPPED | OUTPATIENT
Start: 2024-06-03

## 2024-06-03 NOTE — TELEPHONE ENCOUNTER
[id number] sent Rx request for the following:      Requested Prescriptions   Pending Prescriptions Disp Refills    buPROPion (WELLBUTRIN XL) 300 MG 24 hr tablet [Pharmacy Med Name: BUPROPION 300MG ER (XL) TABLET] 90 tablet 3     Sig: TAKE 1 TABLET BY MOUTH ONCEDAILY AFTER FINISHING 1 WEEK OF 150MG TABLETS       SSRIs Protocol Failed - 6/3/2024  1:54 PM        Failed - Recent (12 mo) or future (30 days) visit within the authorizing provider's specialty     Last Prescription Date:   5/25/23  Last Fill Qty/Refills:         180, R-3    Last Office Visit:              2/3/23   Future Office visit:           None    Unable to complete prescription refill per RN Medication Refill Policy.     Pt due for annual exam. Routing to provider for refill consideration. Routing to Unit scheduling pool, to assist Pt in scheduling appointment.     Susi Gonzalez RN .............. 6/3/2024  2:00 PM

## 2024-11-30 ENCOUNTER — HEALTH MAINTENANCE LETTER (OUTPATIENT)
Age: 29
End: 2024-11-30

## 2025-07-25 DIAGNOSIS — F34.1 DYSTHYMIC DISORDER: ICD-10-CM

## 2025-07-30 NOTE — TELEPHONE ENCOUNTER
[id number] - [place], [place] - [address] [place] [address]  sent Rx request for the following:      Requested Prescriptions   Pending Prescriptions Disp Refills    buPROPion (WELLBUTRIN XL) 300 MG 24 hr tablet [Pharmacy Med Name: BUPROPION 300MG ER (XL) TABLET] 90 tablet 3     Sig: TAKE 1 TABLET BY MOUTH ONCE DAILY IN THE MORNING       Rx Protocol Bupropion Failed - 7/30/2025 12:15 PM        Failed - PHQ-9 score of less than 5 in past 6 months     Please review last PHQ-9 score.       8/23/2019    10:14 AM 7/22/2021     4:43 PM 2/2/2023     4:37 PM   PHQ-9 SCORE   PHQ-9 Total Score MyChart  9 (Mild depression) 17 (Moderately severe depression)   PHQ-9 Total Score 0 9 17             Failed - Recent (12 month) or future (90 days) visit with authorizing provider's specialty (provided they have been seen in the past 15 months)     The patient must have completed an in-person or virtual visit within the past 12 months or has a future visit scheduled within the next 90 days with the authorizing provider s specialty.  Urgent care and e-visits do not qualify as an office visit for this protocol.          Failed - Blood pressure on file in the past 12 months   Dysthymic disorder [F34.1]  - Primary   Last Prescription Date:   6/3/24  Last Fill Qty/Refills:         90, R-3    Last Office Visit:              2/3/23   Future Office visit:              Future Appointments 7/30/2025 - 1/26/2026      None        Unable to complete prescription refill per RN Medication Refill Policy.     Pt due for annual. Routing to provider for refill consideration. Routing to Unit scheduling pool, to assist Pt in scheduling appointment.     Jeovany Simon RN on 7/30/2025 at 12:17 PM

## 2025-07-31 RX ORDER — BUPROPION HYDROCHLORIDE 300 MG/1
300 TABLET ORAL EVERY MORNING
Qty: 90 TABLET | Refills: 0 | Status: SHIPPED | OUTPATIENT
Start: 2025-07-31